# Patient Record
Sex: FEMALE | Race: WHITE
[De-identification: names, ages, dates, MRNs, and addresses within clinical notes are randomized per-mention and may not be internally consistent; named-entity substitution may affect disease eponyms.]

---

## 2018-12-20 ENCOUNTER — HOSPITAL ENCOUNTER (EMERGENCY)
Dept: HOSPITAL 61 - ER | Age: 53
Discharge: LEFT BEFORE DISCHARGE | End: 2018-12-20
Payer: COMMERCIAL

## 2018-12-20 DIAGNOSIS — R10.9: Primary | ICD-10-CM

## 2018-12-20 DIAGNOSIS — Z53.21: ICD-10-CM

## 2019-03-13 ENCOUNTER — HOSPITAL ENCOUNTER (OUTPATIENT)
Dept: HOSPITAL 61 - KCIC US | Age: 54
Discharge: HOME | End: 2019-03-13
Attending: FAMILY MEDICINE
Payer: COMMERCIAL

## 2019-03-13 DIAGNOSIS — N83.292: Primary | ICD-10-CM

## 2019-03-13 PROCEDURE — 76856 US EXAM PELVIC COMPLETE: CPT

## 2019-03-13 NOTE — [facility]
EXAM: Pelvic sonogram.

 

HISTORY: Ovarian cyst on outside CT.

 

TECHNIQUE: Transabdominal sonographic imaging of the pelvis was performed.

The patient refused transvaginal imaging.

 

COMPARISON: None.

 

FINDINGS: The uterus measures 5.8 x 2.9 x 5.2 cm. The endometrial stripe 

measures 1.3 mm in thickness. There is a complex left ovarian cyst with 

internal echoes measuring 4.6 x 4.5 x 3.8 cm. The combination of this cyst

and ovarian parenchyma measures 5.6 x 6.3 x 4.3 cm. The right ovary 

measures 2.7 x 2.8 x 2.0 cm. There is blood flow within both ovaries. 

There is no pelvic free fluid.

 

IMPRESSION:

1. 4.6 cm left ovarian cyst with internal echoes. No solid lesion 

component is seen. Given the postmenopausal status of the patient, 

sonographic follow-up is recommended to exclude a cystic neoplasm.

2. Otherwise, unremarkable pelvic sonogram. The exam is limited due to the

absence of transvaginal images. The patient deferred transvaginal imaging 

at this time.

 

Electronically signed by: Rachel Nieves MD (3/13/2019 3:11 PM) [id number]

## 2019-12-03 ENCOUNTER — HOSPITAL ENCOUNTER (OUTPATIENT)
Dept: HOSPITAL 61 - KCIC US | Age: 54
Discharge: HOME | End: 2019-12-03
Attending: FAMILY MEDICINE
Payer: SELF-PAY

## 2019-12-03 DIAGNOSIS — N83.202: Primary | ICD-10-CM

## 2019-12-03 PROCEDURE — 76830 TRANSVAGINAL US NON-OB: CPT

## 2019-12-03 PROCEDURE — 76856 US EXAM PELVIC COMPLETE: CPT

## 2019-12-03 NOTE — [facility]
EXAM: Pelvic sonogram.

 

HISTORY: Ovarian cyst follow-up.

 

TECHNIQUE: Transabdominal and transvaginal sonographic imaging of the 

pelvis was performed.

 

COMPARISON: 3/13/2019.

 

FINDINGS: The uterus is retroverted and measures 4.7 x 3.6 x 4.2 cm. The 

endometrial stripe measures 2.2 mm in thickness. There is a 4.9 x 3.9 x 

3.7 cm simple appearing left ovarian cyst. The surrounding left ovarian 

parenchyma demonstrates normal blood flow. The right ovary is normal in 

size and demonstrates normal blood flow. There is no pelvic free fluid.

 

IMPRESSION:

1. 4.9 x 3.9 x 3.7 cm left ovarian cyst, previously measuring 4.6 x 4.5 x 

3.8 cm. The overall volume of the cyst is not signally changed compared to

prior exam. Continued follow-up is recommended in this postmenopausal 

female.

2. Retroverted uterus.

 

Electronically signed by: Rachel Nieves MD (12/3/2019 1:22 PM) [facility]